# Patient Record
Sex: FEMALE | Race: ASIAN | NOT HISPANIC OR LATINO | URBAN - METROPOLITAN AREA
[De-identification: names, ages, dates, MRNs, and addresses within clinical notes are randomized per-mention and may not be internally consistent; named-entity substitution may affect disease eponyms.]

---

## 2019-11-30 ENCOUNTER — EMERGENCY (EMERGENCY)
Facility: HOSPITAL | Age: 34
LOS: 1 days | Discharge: ANOTHER HOSPITAL | End: 2019-11-30
Admitting: EMERGENCY MEDICINE
Payer: MEDICAID

## 2019-11-30 VITALS
TEMPERATURE: 98 F | RESPIRATION RATE: 18 BRPM | DIASTOLIC BLOOD PRESSURE: 65 MMHG | OXYGEN SATURATION: 100 % | HEART RATE: 80 BPM | SYSTOLIC BLOOD PRESSURE: 123 MMHG

## 2019-11-30 DIAGNOSIS — F33.2 MAJOR DEPRESSIVE DISORDER, RECURRENT SEVERE WITHOUT PSYCHOTIC FEATURES: ICD-10-CM

## 2019-11-30 LAB
ALBUMIN SERPL ELPH-MCNC: 4.2 G/DL — SIGNIFICANT CHANGE UP (ref 3.3–5)
ALP SERPL-CCNC: 67 U/L — SIGNIFICANT CHANGE UP (ref 40–120)
ALT FLD-CCNC: 20 U/L — SIGNIFICANT CHANGE UP (ref 4–33)
AMPHET UR-MCNC: NEGATIVE — SIGNIFICANT CHANGE UP
ANION GAP SERPL CALC-SCNC: 14 MMO/L — SIGNIFICANT CHANGE UP (ref 7–14)
APAP SERPL-MCNC: < 15 UG/ML — LOW (ref 15–25)
APPEARANCE UR: SIGNIFICANT CHANGE UP
AST SERPL-CCNC: 21 U/L — SIGNIFICANT CHANGE UP (ref 4–32)
BACTERIA # UR AUTO: HIGH
BARBITURATES UR SCN-MCNC: NEGATIVE — SIGNIFICANT CHANGE UP
BASOPHILS # BLD AUTO: 0.03 K/UL — SIGNIFICANT CHANGE UP (ref 0–0.2)
BASOPHILS NFR BLD AUTO: 0.6 % — SIGNIFICANT CHANGE UP (ref 0–2)
BENZODIAZ UR-MCNC: NEGATIVE — SIGNIFICANT CHANGE UP
BILIRUB SERPL-MCNC: < 0.2 MG/DL — LOW (ref 0.2–1.2)
BILIRUB UR-MCNC: NEGATIVE — SIGNIFICANT CHANGE UP
BLOOD UR QL VISUAL: SIGNIFICANT CHANGE UP
BUN SERPL-MCNC: 11 MG/DL — SIGNIFICANT CHANGE UP (ref 7–23)
CALCIUM SERPL-MCNC: 9.2 MG/DL — SIGNIFICANT CHANGE UP (ref 8.4–10.5)
CANNABINOIDS UR-MCNC: NEGATIVE — SIGNIFICANT CHANGE UP
CHLORIDE SERPL-SCNC: 102 MMOL/L — SIGNIFICANT CHANGE UP (ref 98–107)
CO2 SERPL-SCNC: 19 MMOL/L — LOW (ref 22–31)
COCAINE METAB.OTHER UR-MCNC: NEGATIVE — SIGNIFICANT CHANGE UP
COLOR SPEC: COLORLESS — SIGNIFICANT CHANGE UP
CREAT SERPL-MCNC: 0.64 MG/DL — SIGNIFICANT CHANGE UP (ref 0.5–1.3)
EOSINOPHIL # BLD AUTO: 0.1 K/UL — SIGNIFICANT CHANGE UP (ref 0–0.5)
EOSINOPHIL NFR BLD AUTO: 1.9 % — SIGNIFICANT CHANGE UP (ref 0–6)
ETHANOL BLD-MCNC: < 10 MG/DL — SIGNIFICANT CHANGE UP
GLUCOSE SERPL-MCNC: 88 MG/DL — SIGNIFICANT CHANGE UP (ref 70–99)
GLUCOSE UR-MCNC: NEGATIVE — SIGNIFICANT CHANGE UP
HCG SERPL-ACNC: < 5 MIU/ML — SIGNIFICANT CHANGE UP
HCT VFR BLD CALC: 37.6 % — SIGNIFICANT CHANGE UP (ref 34.5–45)
HGB BLD-MCNC: 12.2 G/DL — SIGNIFICANT CHANGE UP (ref 11.5–15.5)
HYALINE CASTS # UR AUTO: HIGH
IMM GRANULOCYTES NFR BLD AUTO: 0.2 % — SIGNIFICANT CHANGE UP (ref 0–1.5)
KETONES UR-MCNC: NEGATIVE — SIGNIFICANT CHANGE UP
LEUKOCYTE ESTERASE UR-ACNC: SIGNIFICANT CHANGE UP
LYMPHOCYTES # BLD AUTO: 1.77 K/UL — SIGNIFICANT CHANGE UP (ref 1–3.3)
LYMPHOCYTES # BLD AUTO: 32.8 % — SIGNIFICANT CHANGE UP (ref 13–44)
MCHC RBC-ENTMCNC: 27.7 PG — SIGNIFICANT CHANGE UP (ref 27–34)
MCHC RBC-ENTMCNC: 32.4 % — SIGNIFICANT CHANGE UP (ref 32–36)
MCV RBC AUTO: 85.3 FL — SIGNIFICANT CHANGE UP (ref 80–100)
METHADONE UR-MCNC: NEGATIVE — SIGNIFICANT CHANGE UP
MONOCYTES # BLD AUTO: 0.73 K/UL — SIGNIFICANT CHANGE UP (ref 0–0.9)
MONOCYTES NFR BLD AUTO: 13.5 % — SIGNIFICANT CHANGE UP (ref 2–14)
NEUTROPHILS # BLD AUTO: 2.75 K/UL — SIGNIFICANT CHANGE UP (ref 1.8–7.4)
NEUTROPHILS NFR BLD AUTO: 51 % — SIGNIFICANT CHANGE UP (ref 43–77)
NITRITE UR-MCNC: NEGATIVE — SIGNIFICANT CHANGE UP
NRBC # FLD: 0 K/UL — SIGNIFICANT CHANGE UP (ref 0–0)
OPIATES UR-MCNC: NEGATIVE — SIGNIFICANT CHANGE UP
OXYCODONE UR-MCNC: NEGATIVE — SIGNIFICANT CHANGE UP
PCP UR-MCNC: NEGATIVE — SIGNIFICANT CHANGE UP
PH UR: 6 — SIGNIFICANT CHANGE UP (ref 5–8)
PLATELET # BLD AUTO: 196 K/UL — SIGNIFICANT CHANGE UP (ref 150–400)
PMV BLD: 10.8 FL — SIGNIFICANT CHANGE UP (ref 7–13)
POTASSIUM SERPL-MCNC: 3.7 MMOL/L — SIGNIFICANT CHANGE UP (ref 3.5–5.3)
POTASSIUM SERPL-SCNC: 3.7 MMOL/L — SIGNIFICANT CHANGE UP (ref 3.5–5.3)
PROT SERPL-MCNC: 7.5 G/DL — SIGNIFICANT CHANGE UP (ref 6–8.3)
PROT UR-MCNC: 10 — SIGNIFICANT CHANGE UP
RBC # BLD: 4.41 M/UL — SIGNIFICANT CHANGE UP (ref 3.8–5.2)
RBC # FLD: 14.5 % — SIGNIFICANT CHANGE UP (ref 10.3–14.5)
RBC CASTS # UR COMP ASSIST: SIGNIFICANT CHANGE UP (ref 0–?)
SALICYLATES SERPL-MCNC: < 5 MG/DL — LOW (ref 15–30)
SODIUM SERPL-SCNC: 135 MMOL/L — SIGNIFICANT CHANGE UP (ref 135–145)
SP GR SPEC: 1.01 — SIGNIFICANT CHANGE UP (ref 1–1.04)
SQUAMOUS # UR AUTO: SIGNIFICANT CHANGE UP
UROBILINOGEN FLD QL: NORMAL — SIGNIFICANT CHANGE UP
WBC # BLD: 5.39 K/UL — SIGNIFICANT CHANGE UP (ref 3.8–10.5)
WBC # FLD AUTO: 5.39 K/UL — SIGNIFICANT CHANGE UP (ref 3.8–10.5)
WBC UR QL: >50 — HIGH (ref 0–?)

## 2019-11-30 PROCEDURE — 99285 EMERGENCY DEPT VISIT HI MDM: CPT | Mod: 25

## 2019-11-30 PROCEDURE — 99285 EMERGENCY DEPT VISIT HI MDM: CPT

## 2019-11-30 PROCEDURE — 93010 ELECTROCARDIOGRAM REPORT: CPT

## 2019-11-30 RX ORDER — SERTRALINE 25 MG/1
50 TABLET, FILM COATED ORAL DAILY
Refills: 0 | Status: DISCONTINUED | OUTPATIENT
Start: 2019-11-30 | End: 2019-11-30

## 2019-11-30 RX ORDER — OLANZAPINE 15 MG/1
5 TABLET, FILM COATED ORAL ONCE
Refills: 0 | Status: COMPLETED | OUTPATIENT
Start: 2019-11-30 | End: 2019-11-30

## 2019-11-30 RX ORDER — TETANUS TOXOID, REDUCED DIPHTHERIA TOXOID AND ACELLULAR PERTUSSIS VACCINE, ADSORBED 5; 2.5; 8; 8; 2.5 [IU]/.5ML; [IU]/.5ML; UG/.5ML; UG/.5ML; UG/.5ML
0.5 SUSPENSION INTRAMUSCULAR ONCE
Refills: 0 | Status: COMPLETED | OUTPATIENT
Start: 2019-11-30 | End: 2019-11-30

## 2019-11-30 RX ORDER — BACITRACIN ZINC 500 UNIT/G
1 OINTMENT IN PACKET (EA) TOPICAL ONCE
Refills: 0 | Status: COMPLETED | OUTPATIENT
Start: 2019-11-30 | End: 2019-11-30

## 2019-11-30 RX ADMIN — OLANZAPINE 5 MILLIGRAM(S): 15 TABLET, FILM COATED ORAL at 21:21

## 2019-11-30 RX ADMIN — Medication 1 APPLICATION(S): at 21:21

## 2019-11-30 RX ADMIN — TETANUS TOXOID, REDUCED DIPHTHERIA TOXOID AND ACELLULAR PERTUSSIS VACCINE, ADSORBED 0.5 MILLILITER(S): 5; 2.5; 8; 8; 2.5 SUSPENSION INTRAMUSCULAR at 21:21

## 2019-11-30 NOTE — ED PROVIDER NOTE - PROGRESS NOTE DETAILS
Raffaele KANG: Pt signed out to me.  She has been evaluated by psychiatry and is pending admission.  Labs reviewed, UA pos but pt is without urinary sxs.  Ucx sent, will not treat as pt is asymptomatic.  Pt currently sleeping comfortably, no issues.  Pending psych admission.

## 2019-11-30 NOTE — ED ADULT TRIAGE NOTE - CHIEF COMPLAINT QUOTE
pt. brought from primary care doctor's office, reported suicidal thoughts, arrives w/ superficial lacerations to the L wrist/arm. Denies homicidal ideations, hallucinations. Pt. dx w/ depression, states she took 2 Zoloft pills instead of 1.

## 2019-11-30 NOTE — ED BEHAVIORAL HEALTH NOTE - BEHAVIORAL HEALTH NOTE
SW obtained contact information from pt re: collateral contacts.  Pt provided the names of two friends:  Baljitsoraida Rene [phone] and La Mayfield, [phone].  SW contacted Sabrinala, however no answer-message was left requesting a return phone call.  SW contacted Pan who reports that pt has spoken about feeling hopeless and depressed.  She states that pt has not shared too much information with her-only that she was at the doctors office earlier today.  She states that pt has not expressed any suicidal thoughts or ideations to her in their conversations.  She reports that pt lives alone.

## 2019-11-30 NOTE — ED BEHAVIORAL HEALTH ASSESSMENT NOTE - SUICIDE PROTECTIVE FACTORS
Responsibility to family and others/Identifies reasons for living/Has future plans/Cultural, spiritual and/or moral attitudes against suicide/Confucianist beliefs

## 2019-11-30 NOTE — ED BEHAVIORAL HEALTH ASSESSMENT NOTE - DESCRIPTION
has 2 children,  from family, lives alone, came [place] [facility] [place], [place] in [place] for [facility] but came back in 2015;  from  in 2017; is not a practicing [employer], attended 12th grade migraine, back pains Since her  ED arrival, the Pt has been cooperative.  There has been no agitation/aggressive behavior.  No verbalization of active HI.   Continues to endorse SI but no intent or plans at this time.  There are no signs/symptoms of acute yessica or florid psychosis.  The Pt is not experiencing any AH/VH.  Pt is not showing any signs/symptoms of intoxication or withdrawal.  She has not tested limits.. Has maintained appropriate boundaries. Pt has been easily redirected.      Vital Signs Last 24 Hrs  T(C): 36.7 (30 Nov 2019 18:43), Max: 36.7 (30 Nov 2019 18:43)  T(F): 98 (30 Nov 2019 18:43), Max: 98 (30 Nov 2019 18:43)  HR: 80 (30 Nov 2019 18:43) (80 - 80)  BP: 123/65 (30 Nov 2019 18:43) (123/65 - 123/65)  BP(mean): --  RR: 18 (30 Nov 2019 18:43) (18 - 18)  SpO2: 100% (30 Nov 2019 18:43) (100% - 100%)

## 2019-11-30 NOTE — ED BEHAVIORAL HEALTH ASSESSMENT NOTE - OTHER
JEIMY HUGO STOP reference #: [id number]: NO CONTROLLED SUBSTANCE PRESCRIBED marital conflict see HPI for details

## 2019-11-30 NOTE — ED BEHAVIORAL HEALTH ASSESSMENT NOTE - SUICIDE RISK FACTORS
Access to lethal methods (pills, firearm, etc.: Ask specifically about presence or absence of a firearm in the home or ease of accessing/Insomnia/Recent onset of current/past psychiatric diagnosis/Anhedonia/Hopelessness or despair/Mood Disorder current/past/Impulsivity/Current mood episode/Agitation/Severe Anxiety/Panic/Unable to engage in safety planning

## 2019-11-30 NOTE — ED ADULT NURSE NOTE - OBJECTIVE STATEMENT
34 year old female presents to the ER from MD's office for depression, pt expressed suicidal thoughts in the office and arrived here via EMS with superficial lacerations noted to left forearm 34 year old female presents to the ER from MD's office for depression, pt expressed suicidal thoughts in the office and arrived here via EMS with superficial lacerations noted to left forearm pt states she cut herself yesterday and wanted to kill herself

## 2019-11-30 NOTE — ED BEHAVIORAL HEALTH ASSESSMENT NOTE - AXIS IV
Economic problems/Other psychosocial and environmental problems/Problem related to social environment/Problems with primary support
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
## 2019-11-30 NOTE — ED BEHAVIORAL HEALTH ASSESSMENT NOTE - RISK ASSESSMENT
RISK ASSESSMENT:   Modifiable risk factors: depression, anxiety and delusions  ]Unmodifiable risk factors: Pt is doing poorly, hx of OD on pills, had SIB, limited social support, legal issues, financial constraints   Protective factors: domiciled, close relationship and therapeutic relationship with children, no access to guns, not manic, no substance abuse hx, no fam hx of SA, Faith, working     At this time given all risks taken into consideration, the Pt is at chronically elevated risk of harming self and would not be deemed dischargeable back to the community.  That increased risk can be mitigated by a psychiatric admission with supervision, continuing psych meds with titration towards efficacious dosing range High Acute Suicide Risk

## 2019-11-30 NOTE — ED PROVIDER NOTE - CLINICAL SUMMARY MEDICAL DECISION MAKING FREE TEXT BOX
35 y/o F hx MDD   Labs, Urine Tox/UA, EKG. Multiple superficial  cuts  to  left forearm. No evidence of cellulitis. No indication for suturing. Tetanus updated.  Bacitracin to area      Medical evaluation performed. There is no clinical evidence of intoxication or any acute medical problem requiring immediate intervention. Patient is awaiting psychiatric consultation. Final disposition will be determined by psychiatrist.

## 2019-11-30 NOTE — ED BEHAVIORAL HEALTH ASSESSMENT NOTE - ACTIVATING EVENTS/STRESSORS
Non-compliant or not receiving treatment/Triggering events leading to humiliation, shame, and/or despair (e.g. Loss of relationship, financial or health status) (real or anticipated)/Legal problems/Inadequate social supports/Hopeless about or dissatisfied with provider or treatment/Current or pending social isolation

## 2019-11-30 NOTE — ED PROVIDER NOTE - OBJECTIVE STATEMENT
33 y/o F hx MDD BIBA referred by PCP w c/o  worsening depression and suicidal behaviour. Admit to self inflicting multiple superficial  cuts  with a razor to her left forearm.  Admits to multiple social stressors.  Denies HI/VH/AH. Denies pain, SOB, fever, chills chest/ abdominal  discomfort. Denies falling,  punching or kicking any objects. Denies recent use of alcohol or illicit drugs. Admits to medication compliance. 33 y/o F hx MDD BIBA referred by PCP w c/o  worsening depression and suicidal behaviour. Admit to self inflicting multiple superficial  cuts  with a knife to her left forearm.  Admits to multiple social stressors.  Denies HI/VH/AH. Denies pain, SOB, fever, chills chest/ abdominal  discomfort. Denies falling,  punching or kicking any objects. Denies recent use of alcohol or illicit drugs. Admits to medication compliance.

## 2019-11-30 NOTE — ED BEHAVIORAL HEALTH ASSESSMENT NOTE - SUMMARY
34/F with no established past psych hx, no prior psych hosps, reported prior hx of OD on advil in 2009 but did not seek any psych help nor was she admitted due to the OD; has no substance abuse hx.  Today, BIB EMS as a referral from his PCP's office where she initially reported having suicidal thoughts.  Whilst at the triage, she was noted to have superficial cuts to the left forearm/wrist area.  At this time, she presents with symptoms suggestive of severe MDD; there is psychotic component (referential thoughts/persecutory delusions - "nobody likes me").  Said symptoms precipitated and perpetuated by ongoing marital conflict particularly re: custody over her children and children support; other psychosocial stressors involved are: limited social support, financial constraints, ongoing legal issues.  Said symptoms have significantly caused functional impairment on the Pt.  Pt has no symptoms suggestive of yessica.  She is not delirious.  Not intoxicated or in withdrawal.  At this time, Pt cannot be safely discharged back to the community as she remains a threat to self.  She will benefit from in-Pt psych admission for stabilization and ensuring safety    RECOMMENDATIONS:   1. CONTINUE WITH ZOLOFT 50mg daily. titrate as necessary  2. ZYPREXA 5mg HS for control of psychosis, sleep disturbance  3. PRNs: ZYPREXA 5mg PO/IM Q6Hrs for agitation/SEVERE AGITATION   4. ADMIT 9.13 once bed available and Pt is medically cleared

## 2019-12-01 ENCOUNTER — OUTPATIENT (OUTPATIENT)
Dept: OUTPATIENT SERVICES | Facility: HOSPITAL | Age: 34
LOS: 1 days | End: 2019-12-01
Payer: MEDICAID

## 2019-12-01 ENCOUNTER — INPATIENT (INPATIENT)
Facility: HOSPITAL | Age: 34
LOS: 3 days | Discharge: HOME | DRG: 885 | End: 2019-12-05
Attending: PSYCHIATRY & NEUROLOGY | Admitting: PSYCHIATRY & NEUROLOGY
Payer: COMMERCIAL

## 2019-12-01 VITALS
RESPIRATION RATE: 18 BRPM | DIASTOLIC BLOOD PRESSURE: 66 MMHG | TEMPERATURE: 98 F | HEART RATE: 93 BPM | OXYGEN SATURATION: 100 % | SYSTOLIC BLOOD PRESSURE: 106 MMHG

## 2019-12-01 DIAGNOSIS — F33.2 MAJOR DEPRESSIVE DISORDER, RECURRENT SEVERE WITHOUT PSYCHOTIC FEATURES: ICD-10-CM

## 2019-12-01 DIAGNOSIS — F32.0 MAJOR DEPRESSIVE DISORDER, SINGLE EPISODE, MILD: ICD-10-CM

## 2019-12-01 PROCEDURE — G0008: CPT

## 2019-12-01 PROCEDURE — 87086 URINE CULTURE/COLONY COUNT: CPT

## 2019-12-01 PROCEDURE — 90686 IIV4 VACC NO PRSV 0.5 ML IM: CPT

## 2019-12-01 PROCEDURE — 84443 ASSAY THYROID STIM HORMONE: CPT

## 2019-12-01 PROCEDURE — 36415 COLL VENOUS BLD VENIPUNCTURE: CPT

## 2019-12-01 PROCEDURE — 80061 LIPID PANEL: CPT

## 2019-12-01 PROCEDURE — 90791 PSYCH DIAGNOSTIC EVALUATION: CPT

## 2019-12-01 PROCEDURE — 83036 HEMOGLOBIN GLYCOSYLATED A1C: CPT

## 2019-12-01 PROCEDURE — 93005 ELECTROCARDIOGRAM TRACING: CPT

## 2019-12-01 PROCEDURE — 84702 CHORIONIC GONADOTROPIN TEST: CPT

## 2019-12-01 RX ORDER — DIPHENHYDRAMINE HCL 50 MG
50 CAPSULE ORAL EVERY 6 HOURS
Refills: 0 | Status: DISCONTINUED | OUTPATIENT
Start: 2019-12-01 | End: 2019-12-05

## 2019-12-01 RX ORDER — CEPHALEXIN 500 MG
500 CAPSULE ORAL EVERY 12 HOURS
Refills: 0 | Status: DISCONTINUED | OUTPATIENT
Start: 2019-12-01 | End: 2019-12-01

## 2019-12-01 RX ORDER — HALOPERIDOL DECANOATE 100 MG/ML
5 INJECTION INTRAMUSCULAR EVERY 6 HOURS
Refills: 0 | Status: DISCONTINUED | OUTPATIENT
Start: 2019-12-01 | End: 2019-12-05

## 2019-12-01 RX ORDER — LACTOBACILLUS ACIDOPHILUS 100MM CELL
1 CAPSULE ORAL DAILY
Refills: 0 | Status: DISCONTINUED | OUTPATIENT
Start: 2019-12-01 | End: 2019-12-05

## 2019-12-01 RX ORDER — INFLUENZA VIRUS VACCINE 15; 15; 15; 15 UG/.5ML; UG/.5ML; UG/.5ML; UG/.5ML
0.5 SUSPENSION INTRAMUSCULAR ONCE
Refills: 0 | Status: COMPLETED | OUTPATIENT
Start: 2019-12-01 | End: 2019-12-03

## 2019-12-01 RX ORDER — CEPHALEXIN 500 MG
500 CAPSULE ORAL EVERY 12 HOURS
Refills: 0 | Status: DISCONTINUED | OUTPATIENT
Start: 2019-12-01 | End: 2019-12-05

## 2019-12-01 RX ORDER — SERTRALINE 25 MG/1
50 TABLET, FILM COATED ORAL DAILY
Refills: 0 | Status: DISCONTINUED | OUTPATIENT
Start: 2019-12-02 | End: 2019-12-02

## 2019-12-01 RX ADMIN — Medication 500 MILLIGRAM(S): at 21:30

## 2019-12-01 NOTE — ED ADULT NURSE REASSESSMENT NOTE - NS ED NURSE REASSESS COMMENT FT1
0830 Received report from night RN pt lying on stretcher in nad eyes close breathing even & unlabored safety & comfort measures maintained eval on going.
1330 Pt calm & cooperative c/o depression pt made aware of admission to [facility] transported by EMS.
Pt sleeping, respirations even and unlabored, NAD noted at this time. will continue to monitor
Pt sleeping, respirations even and non labored. Pt awaiting inpatient psychiatric admission bed.

## 2019-12-01 NOTE — H&P ADULT - ASSESSMENT
33 yo F with PMH significant for Migraine. Depression admitted to 5N for eval. of worsening of depression and passive suicidal ideation.    # Depression/Passive Suicidal ideation/ Psych problems  - Manage as per primary psych team     # Suprapubic pain  # UA contaminated but WBC > 50 and Pt is c/o subjective fever and suprapubic pain  - Started on Keflex by ED MD, c/w Abx  - UC    # Hx of Migraine  - Pt reported that she takes meds daily  for migraine but don't remember name  - C/w Home meds, pt reported that friend will bring medicine tomorrow or Pt will call to obtain info about meds name and dose     # DVT Ppx  - Encourage Ambulation    IMPROVE VTE Individual Risk Assessment    RISK                                                                Points    [  ] Previous VTE                                                  3    [  ] Thrombophilia                                               2    [  ] Lower limb paralysis                                      2        (unable to hold up >15 seconds)      [  ] Current Cancer                                              2         (within 6 months)    [  ] Immobilization > 24 hrs                                1    [  ] ICU/CCU stay > 24 hours                              1    [  ] Age > 60                                                      1    IMPROVE VTE Score _0________    IMPROVE Score 0-1: Low Risk, No VTE prophylaxis required for most patients, encourage ambulation.   IMPROVE Score 2-3: At risk, pharmacologic VTE prophylaxis is indicated for most patients (in the absence of a contraindication)  IMPROVE Score > or = 4: High Risk, pharmacologic VTE prophylaxis is indicated for most patients (in the absence of a contraindication)    Case d/w        -

## 2019-12-01 NOTE — ED BEHAVIORAL HEALTH NOTE - BEHAVIORAL HEALTH NOTE
Worker called [facility] and spoke to [facility] who states there are no adult beds. Worker then called [facility] and left a  for call back. Patient has straight Janie and cannot go to [facility]. Worker then called [facility] and spoke to Dr. Sanders who states to fax the packet for review. Worker called [facility] and spoke with Dr. Gray who states there is no beds.
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
## 2019-12-01 NOTE — ED BEHAVIORAL HEALTH NOTE - BEHAVIORAL HEALTH NOTE
Worker spoke with VIDYA Schafer at [facility] and worker explained that EMS presented at this time to the emergency room. He states patient can still be transported for acceptance.

## 2019-12-01 NOTE — H&P ADULT - HISTORY OF PRESENT ILLNESS
35 yo F with PMH significant for Migraine. Depression admitted to N for eval. of worsening of depression and passive suicidal ideation. Pt is resting in bed, calm. Pt is c/o worsening of depression 2/2 to stress as she is going through divorce process. c/o helplessness, hopelessness, feeling down. multiple  superficial cuts on left forearm. Pt is also c/o suprapubic pain and pressure sometimes. c/o subjective fever. Denies chills, N/V/D. Denies headache, dizziness, chest pain, palpitation or SOB. Medicine consult is called for medical Mx. Pt was seen and examined in presence of Night Nursing Assistant.    PMH: As above  PSH: Pt denies any surgery in the past  Social Hx: Pt denies smoking, drinking alcohol or using any recreational drugs.  Utox: Negative.   Family Hx: Pt is tearful on asking question about family Hx, couldn't obtain any info.

## 2019-12-01 NOTE — PATIENT PROFILE BEHAVIORAL HEALTH - NS TRANSFER PATIENT BELONGINGS
Other belongings/ear phones;$3.04; 4 credit cards/Cell Phone/PDA (specify)/Clothing/Jewelry/Money (specify)
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
## 2019-12-01 NOTE — ED BEHAVIORAL HEALTH NOTE - BEHAVIORAL HEALTH NOTE
Worker called [facility] and spoke with NP Hanh who states that patient is accepted to [facility] and nurse to nurse should be provided at [phone]. Accepting Dr. Reyes. Worker informed RN to set up transportation. Worker also faxed legal paper work to [facility].

## 2019-12-01 NOTE — PATIENT PROFILE BEHAVIORAL HEALTH - COPING STRESSORS, PROFILE
employment concerns/mental health condition/relationship concerns/limited support system/financial/change in residence

## 2019-12-01 NOTE — H&P ADULT - SKIN COMMENTS
multiple superficial cuts on left forearm, no active bleeding or discharge , no erythema, healing well

## 2019-12-01 NOTE — PROGRESS NOTE BEHAVIORAL HEALTH - NSBHADDHXPSYSOCFT_PSY_A_CORE
As per chart review: has 2 children,  from family, lives alone, came [place] from [facility] in [place]2005, left [place] in [place] for [place] but came back in 2015;  from  in 2017; is not a practicing [employer]az[employer]ism, attended 12th grade
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
## 2019-12-01 NOTE — ED BEHAVIORAL HEALTH NOTE - BEHAVIORAL HEALTH NOTE
Worker called [facility] and spoke with VIDYA Schafer. Worker explained that ems was called by RN and that they will arrive before 4pm. He states that patient is still accepted as of now.

## 2019-12-01 NOTE — H&P ADULT - NSHPPHYSICALEXAM_GEN_ALL_CORE
Vital Signs Last 24 Hrs  T(C): 36.8 (01 Dec 2019 09:40), Max: 36.8 (01 Dec 2019 09:40)  T(F): 98.2 (01 Dec 2019 09:40), Max: 98.2 (01 Dec 2019 09:40)  HR: 93 (01 Dec 2019 09:40) (93 - 93)  BP: 106/66 (01 Dec 2019 09:40) (106/66 - 106/66)  RR: 18 (01 Dec 2019 09:40) (18 - 18)  SpO2: 100% (01 Dec 2019 09:40) (100% - 100%)

## 2019-12-01 NOTE — PROGRESS NOTE BEHAVIORAL HEALTH - NSBHFUPSTRENGTHS_PSY_A_CORE
In good physical health/Motivated and ready for change/Attempting to realize his/her potential/Intelligent

## 2019-12-02 VITALS — RESPIRATION RATE: 12 BRPM | TEMPERATURE: 98 F | OXYGEN SATURATION: 100 %

## 2019-12-02 DIAGNOSIS — F34.1 DYSTHYMIC DISORDER: ICD-10-CM

## 2019-12-02 DIAGNOSIS — Z63.0 PROBLEMS IN RELATIONSHIP WITH SPOUSE OR PARTNER: ICD-10-CM

## 2019-12-02 LAB
BACTERIA UR CULT: SIGNIFICANT CHANGE UP
CHOLEST SERPL-MCNC: 152 MG/DL — SIGNIFICANT CHANGE UP (ref 10–199)
HDLC SERPL-MCNC: 38 MG/DL — LOW
LIPID PNL WITH DIRECT LDL SERPL: 100 MG/DL — SIGNIFICANT CHANGE UP
SPECIMEN SOURCE: SIGNIFICANT CHANGE UP
TOTAL CHOLESTEROL/HDL RATIO MEASUREMENT: 4 RATIO — SIGNIFICANT CHANGE UP (ref 3.3–7.1)
TRIGL SERPL-MCNC: 69 MG/DL — SIGNIFICANT CHANGE UP (ref 10–149)

## 2019-12-02 PROCEDURE — 93010 ELECTROCARDIOGRAM REPORT: CPT

## 2019-12-02 PROCEDURE — 99232 SBSQ HOSP IP/OBS MODERATE 35: CPT

## 2019-12-02 RX ORDER — BACITRACIN ZINC 500 UNIT/G
1 OINTMENT IN PACKET (EA) TOPICAL
Refills: 0 | Status: COMPLETED | OUTPATIENT
Start: 2019-12-02 | End: 2019-12-04

## 2019-12-02 RX ORDER — SERTRALINE 25 MG/1
75 TABLET, FILM COATED ORAL DAILY
Refills: 0 | Status: DISCONTINUED | OUTPATIENT
Start: 2019-12-03 | End: 2019-12-05

## 2019-12-02 RX ORDER — ALBUTEROL 90 UG/1
2 AEROSOL, METERED ORAL EVERY 6 HOURS
Refills: 0 | Status: DISCONTINUED | OUTPATIENT
Start: 2019-12-02 | End: 2019-12-05

## 2019-12-02 RX ORDER — ACETAMINOPHEN 500 MG
650 TABLET ORAL EVERY 6 HOURS
Refills: 0 | Status: DISCONTINUED | OUTPATIENT
Start: 2019-12-02 | End: 2019-12-05

## 2019-12-02 RX ORDER — LANOLIN ALCOHOL/MO/W.PET/CERES
3 CREAM (GRAM) TOPICAL AT BEDTIME
Refills: 0 | Status: DISCONTINUED | OUTPATIENT
Start: 2019-12-02 | End: 2019-12-05

## 2019-12-02 RX ADMIN — Medication 500 MILLIGRAM(S): at 21:09

## 2019-12-02 RX ADMIN — Medication 650 MILLIGRAM(S): at 09:05

## 2019-12-02 RX ADMIN — Medication 3 MILLIGRAM(S): at 21:15

## 2019-12-02 RX ADMIN — SERTRALINE 50 MILLIGRAM(S): 25 TABLET, FILM COATED ORAL at 09:04

## 2019-12-02 RX ADMIN — Medication 500 MILLIGRAM(S): at 09:04

## 2019-12-02 RX ADMIN — Medication 650 MILLIGRAM(S): at 08:21

## 2019-12-02 RX ADMIN — Medication 1 TABLET(S): at 09:04

## 2019-12-02 RX ADMIN — Medication 1 APPLICATION(S): at 21:09

## 2019-12-02 SDOH — SOCIAL STABILITY - SOCIAL INSECURITY: PROBLEMS IN RELATIONSHIP WITH SPOUSE OR PARTNER: Z63.0

## 2019-12-02 NOTE — PROGRESS NOTE ADULT - SUBJECTIVE AND OBJECTIVE BOX
HOSPITALIST PROGRESS NOTE:  SUBJECTIVE:  PCP:  Chief Complaint: Patient is a 34y old  Female who presents with a chief complaint of c/o worsening of depression (01 Dec 2019 19:47)      HPI:  35 yo F with PMH significant for Migraine. Depression admitted to  for eval. of worsening of depression and passive suicidal ideation. Pt is resting in bed, calm. Pt is c/o worsening of depression 2/2 to stress as she is going through divorce process. c/o helplessness, hopelessness, feeling down. multiple  superficial cuts on left forearm. Pt is also c/o suprapubic pain and pressure sometimes. c/o subjective fever. Denies chills, N/V/D. Denies headache, dizziness, chest pain, palpitation or SOB. Medicine consult is called for medical Mx. Pt was seen and examined in presence of Night Nursing Assistant.    : ABove reviewed; Patient has some pelvic pain; No urinary symptoms; Urine cx never sent prior to starting ABX      Allergies:  No Known Allergies    REVIEW OF SYSTEMS:  See HPI. All other review of systems is negative unless indicated above.     OBJECTIVE  Physical Exam:  Vital Signs:    Vital Signs Last 24 Hrs  T(C): 36.7 (02 Dec 2019 11:42), Max: 36.7 (02 Dec 2019 11:42)  T(F): 98.1 (02 Dec 2019 11:42), Max: 98.1 (02 Dec 2019 11:42)  HR: --  BP: --  BP(mean): --  RR: 12 (02 Dec 2019 11:42) (12 - 12)  SpO2: 100% (02 Dec 2019 11:42) (100% - 100%)  I&O's Summary      Constitutional: NAD, awake and alert, well-developed  Neurological: AAO x 3, no focal deficits  HEENT: PERRLA, EOMI, MMM  Neck: Soft and supple, No LAD, No JVD  Respiratory: Breath sounds are clear bilaterally, No wheezing, rales or rhonchi  Cardiovascular: S1 and S2, regular rate and rhythm; no Murmurs, gallops or rubs  Gastrointestinal: Bowel Sounds present, soft, nontender, nondistended, no guarding, no rebound tenderness  Back: No CVA tenderness   Extremities: No peripheral edema  Vascular: 2+ peripheral pulses  Musculoskeletal: 5/5 strength b/l upper and lower extremities  Skin: No rashes  Breast: Deferred  Rectal: Deferred    MEDICATIONS  (STANDING):  BACItracin   Ointment 1 Application(s) Topical two times a day  cephalexin 500 milliGRAM(s) Oral every 12 hours  influenza   Vaccine 0.5 milliLiter(s) IntraMuscular once  lactobacillus acidophilus 1 Tablet(s) Oral daily      LABS: All Labs Reviewed:                        12.2   5.39  )-----------( 196      ( 2019 20:00 )             37.6         135  |  102  |  11  ----------------------------<  88  3.7   |  19<L>  |  0.64    Ca    9.2      2019 20:00    TPro  7.5  /  Alb  4.2  /  TBili  < 0.2<L>  /  DBili  x   /  AST  21  /  ALT  20  /  AlkPhos  67              Urinalysis Basic - ( 2019 20:00 )    Color: COLORLESS / Appearance: Lt TURBID / S.008 / pH: 6.0  Gluc: NEGATIVE / Ketone: NEGATIVE  / Bili: NEGATIVE / Urobili: NORMAL   Blood: TRACE / Protein: 10 / Nitrite: NEGATIVE   Leuk Esterase: LARGE / RBC: 3-5 / WBC >50   Sq Epi: MODERATE / Non Sq Epi: x / Bacteria: MANY        Blood Culture:    @ 22:20  Organism --  Gram Stain Blood -- Gram Stain --  Specimen Source URINE MIDSTREAM  Culture-Blood --        RADIOLOGY/EKG:

## 2019-12-02 NOTE — PROGRESS NOTE BEHAVIORAL HEALTH - NSBHCHARTREVIEWLAB_PSY_A_CORE FT
CBC Full  -  ( 2019 20:00 )  WBC Count : 5.39 K/uL  RBC Count : 4.41 M/uL  Hemoglobin : 12.2 g/dL  Hematocrit : 37.6 %  Platelet Count - Automated : 196 K/uL  Mean Cell Volume : 85.3 fL  Mean Cell Hemoglobin : 27.7 pg  Mean Cell Hemoglobin Concentration : 32.4 %  Auto Neutrophil # : 2.75 K/uL  Auto Lymphocyte # : 1.77 K/uL  Auto Monocyte # : 0.73 K/uL  Auto Eosinophil # : 0.10 K/uL  Auto Basophil # : 0.03 K/uL  Auto Neutrophil % : 51.0 %  Auto Lymphocyte % : 32.8 %  Auto Monocyte % : 13.5 %  Auto Eosinophil % : 1.9 %  Auto Basophil % : 0.6 %        135  |  102  |  11  ----------------------------<  88  3.7   |  19<L>  |  0.64    Ca    9.2      2019 20:00    TPro  7.5  /  Alb  4.2  /  TBili  < 0.2<L>  /  DBili  x   /  AST  21  /  ALT  20  /  AlkPhos  67  11-30      Urinalysis Basic - ( 2019 20:00 )    Color: COLORLESS / Appearance: Lt TURBID / S.008 / pH: 6.0  Gluc: NEGATIVE / Ketone: NEGATIVE  / Bili: NEGATIVE / Urobili: NORMAL   Blood: TRACE / Protein: 10 / Nitrite: NEGATIVE   Leuk Esterase: LARGE / RBC: 3-5 / WBC >50   Sq Epi: MODERATE / Non Sq Epi: x / Bacteria: MANY

## 2019-12-03 LAB
CULTURE RESULTS: SIGNIFICANT CHANGE UP
SPECIMEN SOURCE: SIGNIFICANT CHANGE UP

## 2019-12-03 PROCEDURE — 93010 ELECTROCARDIOGRAM REPORT: CPT

## 2019-12-03 PROCEDURE — 99232 SBSQ HOSP IP/OBS MODERATE 35: CPT

## 2019-12-03 RX ADMIN — Medication 500 MILLIGRAM(S): at 09:06

## 2019-12-03 RX ADMIN — Medication 500 MILLIGRAM(S): at 21:22

## 2019-12-03 RX ADMIN — Medication 1 TABLET(S): at 09:06

## 2019-12-03 RX ADMIN — SERTRALINE 75 MILLIGRAM(S): 25 TABLET, FILM COATED ORAL at 09:06

## 2019-12-03 RX ADMIN — INFLUENZA VIRUS VACCINE 0.5 MILLILITER(S): 15; 15; 15; 15 SUSPENSION INTRAMUSCULAR at 09:33

## 2019-12-03 RX ADMIN — Medication 1 APPLICATION(S): at 21:23

## 2019-12-03 NOTE — ED BEHAVIORAL HEALTH NOTE - BEHAVIORAL HEALTH NOTE
SARAH spoke with Dana RAMIREZ from [facility] and obtained authorization for 3 days, 12/1/19 – 12/3/19, review date 12/3/19 with reviewer Alma Unger [phone]. Auth #[id number].

## 2019-12-03 NOTE — PROGRESS NOTE ADULT - SUBJECTIVE AND OBJECTIVE BOX
HOSPITALIST PROGRESS NOTE:  SUBJECTIVE:  PCP:  Chief Complaint: Patient is a 34y old  Female who presents with a chief complaint of c/o worsening of depression (01 Dec 2019 19:47)      HPI:  33 yo F with PMH significant for Migraine. Depression admitted to  for eval. of worsening of depression and passive suicidal ideation. Pt is resting in bed, calm. Pt is c/o worsening of depression 2/2 to stress as she is going through divorce process. c/o helplessness, hopelessness, feeling down. multiple  superficial cuts on left forearm. Pt is also c/o suprapubic pain and pressure sometimes. c/o subjective fever. Denies chills, N/V/D. Denies headache, dizziness, chest pain, palpitation or SOB. Medicine consult is called for medical Mx. Pt was seen and examined in presence of Night Nursing Assistant.    : ABove reviewed; Patient has some pelvic pain; No urinary symptoms; Urine cx never sent prior to starting ABX  12/3:  No overnight events; Urine Cx from  and  was normal       Allergies:  No Known Allergies    REVIEW OF SYSTEMS:  See HPI. All other review of systems is negative unless indicated above.     OBJECTIVE  Physical Exam:  Vital Signs Last 24 Hrs  T(C): 36.9 (03 Dec 2019 09:10), Max: 36.9 (03 Dec 2019 09:10)  T(F): 98.4 (03 Dec 2019 09:10), Max: 98.4 (03 Dec 2019 09:10)  HR: --  BP: --  BP(mean): --  RR: 14 (03 Dec 2019 09:10) (14 - 14)  SpO2: 100% (03 Dec 2019 09:10) (100% - 100%)      Constitutional: NAD, awake and alert, well-developed  Neurological: AAO x 3, no focal deficits  HEENT: PERRLA, EOMI, MMM  Neck: Soft and supple, No LAD, No JVD  Respiratory: Breath sounds are clear bilaterally, No wheezing, rales or rhonchi  Cardiovascular: S1 and S2, regular rate and rhythm; no Murmurs, gallops or rubs  Gastrointestinal: Bowel Sounds present, soft, nontender, nondistended, no guarding, no rebound tenderness  Back: No CVA tenderness   Extremities: No peripheral edema  Vascular: 2+ peripheral pulses  Musculoskeletal: 5/5 strength b/l upper and lower extremities  Skin: No rashes  Breast: Deferred  Rectal: Deferred    MEDICATIONS  (STANDING):  BACItracin   Ointment 1 Application(s) Topical two times a day  cephalexin 500 milliGRAM(s) Oral every 12 hours  influenza   Vaccine 0.5 milliLiter(s) IntraMuscular once  lactobacillus acidophilus 1 Tablet(s) Oral daily      LABS: All Labs Reviewed:                        12.2   5.39  )-----------( 196      ( 2019 20:00 )             37.6         135  |  102  |  11  ----------------------------<  88  3.7   |  19<L>  |  0.64    Ca    9.2      2019 20:00    TPro  7.5  /  Alb  4.2  /  TBili  < 0.2<L>  /  DBili  x   /  AST  21  /  ALT  20  /  AlkPhos  67              Urinalysis Basic - ( 2019 20:00 )    Color: COLORLESS / Appearance: Lt TURBID / S.008 / pH: 6.0  Gluc: NEGATIVE / Ketone: NEGATIVE  / Bili: NEGATIVE / Urobili: NORMAL   Blood: TRACE / Protein: 10 / Nitrite: NEGATIVE   Leuk Esterase: LARGE / RBC: 3-5 / WBC >50   Sq Epi: MODERATE / Non Sq Epi: x / Bacteria: MANY        Blood Culture:    @ 22:20  Organism --  Gram Stain Blood -- Gram Stain --  Specimen Source URINE MIDSTREAM  Culture-Blood --        RADIOLOGY/EKG:

## 2019-12-04 DIAGNOSIS — Z71.89 OTHER SPECIFIED COUNSELING: ICD-10-CM

## 2019-12-04 PROBLEM — F32.9 MAJOR DEPRESSIVE DISORDER, SINGLE EPISODE, UNSPECIFIED: Chronic | Status: ACTIVE | Noted: 2019-11-30

## 2019-12-04 PROCEDURE — 99232 SBSQ HOSP IP/OBS MODERATE 35: CPT

## 2019-12-04 RX ADMIN — Medication 650 MILLIGRAM(S): at 17:01

## 2019-12-04 RX ADMIN — Medication 500 MILLIGRAM(S): at 09:51

## 2019-12-04 RX ADMIN — Medication 1 TABLET(S): at 09:52

## 2019-12-04 RX ADMIN — Medication 3 MILLIGRAM(S): at 23:23

## 2019-12-04 RX ADMIN — Medication 1 APPLICATION(S): at 20:53

## 2019-12-04 RX ADMIN — SERTRALINE 75 MILLIGRAM(S): 25 TABLET, FILM COATED ORAL at 09:52

## 2019-12-04 RX ADMIN — Medication 500 MILLIGRAM(S): at 23:23

## 2019-12-04 RX ADMIN — Medication 1 APPLICATION(S): at 09:54

## 2019-12-05 VITALS — RESPIRATION RATE: 14 BRPM | OXYGEN SATURATION: 100 % | TEMPERATURE: 98 F

## 2019-12-05 PROCEDURE — 99239 HOSP IP/OBS DSCHRG MGMT >30: CPT

## 2019-12-05 RX ORDER — CEPHALEXIN 500 MG
1 CAPSULE ORAL
Qty: 2 | Refills: 0
Start: 2019-12-05 | End: 2019-12-05

## 2019-12-05 RX ORDER — LANOLIN ALCOHOL/MO/W.PET/CERES
1 CREAM (GRAM) TOPICAL
Qty: 14 | Refills: 1
Start: 2019-12-05 | End: 2020-01-01

## 2019-12-05 RX ORDER — SERTRALINE 25 MG/1
3 TABLET, FILM COATED ORAL
Qty: 42 | Refills: 1
Start: 2019-12-05 | End: 2020-01-01

## 2019-12-05 RX ORDER — ALBUTEROL 90 UG/1
2 AEROSOL, METERED ORAL
Qty: 0 | Refills: 0 | DISCHARGE
Start: 2019-12-05

## 2019-12-05 RX ADMIN — Medication 1 TABLET(S): at 09:13

## 2019-12-05 RX ADMIN — Medication 650 MILLIGRAM(S): at 09:15

## 2019-12-05 RX ADMIN — SERTRALINE 75 MILLIGRAM(S): 25 TABLET, FILM COATED ORAL at 09:13

## 2019-12-05 RX ADMIN — Medication 500 MILLIGRAM(S): at 09:13
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
## 2019-12-05 NOTE — DISCHARGE NOTE BEHAVIORAL HEALTH - MEDICATION SUMMARY - MEDICATIONS TO TAKE
I will START or STAY ON the medications listed below when I get home from the hospital:    sertraline 25 mg oral tablet  -- 3 tab(s) by mouth once a day  -- Indication: For Major depressive disorder, single episode, mild    albuterol 90 mcg/inh inhalation aerosol  -- 2 puff(s) inhaled every 6 hours, As needed, Shortness of Breath and/or Wheezing  -- Indication: For remote h/o asthma    cephalexin 500 mg oral capsule  -- 1 cap(s) by mouth every 12 hours MDD:TO  COMPLETE A 5 DAY COURSE  -- Indication: For UTI ( 2 doses remain)    melatonin 3 mg oral tablet  -- 1 tab(s) by mouth once a day (at bedtime), As needed, Insomnia  -- Indication: For insomnia

## 2019-12-05 NOTE — DISCHARGE NOTE BEHAVIORAL HEALTH - NSBHDCTESTSFT_PSY_A_CORE
HgA1C= 5.4  TSH= 1.25  Fasting lipids  Cholesterol = 152  TG= 69  EKG  VR= 84  QT /QTc= 354 /418  HCG negative   No studies are pending

## 2019-12-05 NOTE — PROGRESS NOTE BEHAVIORAL HEALTH - NSBHADMITIPOBSFT_PSY_A_CORE
no acute risk for harm to self / others on unit
Pt currently denies  SI /HI /AH /VH
Pt currently denies active SI

## 2019-12-05 NOTE — PROGRESS NOTE BEHAVIORAL HEALTH - AXIS III
migraines  UTI  remote h/o asthma  s/p superficial self inflicted cuts to left anterior wrist
migraines
migraines  UTI  remote h/o asthma  s/p superficial self inflicted cuts to left anterior wrist

## 2019-12-05 NOTE — PROGRESS NOTE BEHAVIORAL HEALTH - THOUGHT CONTENT
Preoccupations/Ruminations
Unremarkable
Unremarkable
Hopelessness/Guilt/Suicidality/Other/Ruminations
Hopelessness
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
## 2019-12-05 NOTE — PROGRESS NOTE BEHAVIORAL HEALTH - NSBHADMITMEDEDUDETAILS_A_CORE FT
Patient made aware of risk and benefits of Zoloft
Informed consent discussion begun as to a trial of SSRI Zoloft begun by the pt's PCP prior to pt admission to hospital for worsening depression.   The need for titration and step wise clinical improvement with SSRI reviewed with the pt.

## 2019-12-05 NOTE — PROGRESS NOTE BEHAVIORAL HEALTH - PROBLEM SELECTOR PLAN 2
1.Pt urged to attend therapy groups with focus on CBT / DBT  2.Ongoing staff support

## 2019-12-05 NOTE — DISCHARGE NOTE BEHAVIORAL HEALTH - CONDITIONS AT DISCHARGE
Patient was seen and evaluated by treatment team and is discharged. She is alert, ambulatory; no distress noted nor voiced by patient; she denies current suicidal/homicidal ideation, and denies auditory/visual hallucination.  Discharge and follow-up instructions explained and given to her and she  verbalized understanding.  All belongings returned to her and she is dressed appropriately for d/c.  Patient currently awaiting ride for safe d/c/ Patient was seen and evaluated by treatment team and is discharged. She is alert, ambulatory; no distress noted nor voiced by patient; she denies current suicidal/homicidal ideation, and denies auditory/visual hallucination.  Discharge and follow-up instructions explained and given to her and she  verbalized understanding.  All belongings returned to her and she is dressed appropriately for d/c.  Patient currently awaiting ride for safe d/c.

## 2019-12-05 NOTE — DISCHARGE NOTE BEHAVIORAL HEALTH - NSBHDCADDR1FT_A_CORE
[address] [place] [address] [address] [place] [address]    Appt.  Tuesday 12/10 at 4:00 with Rubina Mendoza.  *** Please arrive at 3:30 for paperwork, bring your drivers license or photo ID and you insurance card.***

## 2019-12-05 NOTE — PROGRESS NOTE BEHAVIORAL HEALTH - NSBHFUPINTERVALCCFT_PSY_A_CORE
" I am feeling much better. Not depressed and I slept better too with a sleeping pill. ( Melatonin) . Can you help me financially and with a job?"  Pt completing a 5 day course of Keflex for presumptive UTI.   Rechecked  UA WNL..
" Thank you very much. I am feeling very good now. I will go to all my appointments  and then I go to family court next week."
' I am feeling very good now. Not depressed.  I am not wanting to hurt myself anymore. I want to leave so I can go to the counselling at [facility] and to [facility]     before I go to the family court next week. "
' I am here because I tried to kill myself on Thursday by cutting my wrist with a knife. I wanted to die. The pain from it was more on Saturday  so I saw my regular doctor and he sent me here."
I am depressed; I do not know what to do.

## 2019-12-05 NOTE — DISCHARGE NOTE BEHAVIORAL HEALTH - NSBHDCSUICSAFETYFT_PSY_A_CORE
1.Pt  safety planned discussed daily  and pt amenable to her after care treatment  2.Pt given tel # for 24/7 Crisis Hotline numbers to call should new safety concerns arise  3.Pt will have access to a new outpatient comprehensive treatment team to assist her many psychosocial needs.  4.Pt can recontact this hospital for any concerns prior to her outpatient appointments on 12/10/19   5.Pt aware that she can always return to the nearest hospital ER 24/7 should new safety concerns arise
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
## 2019-12-05 NOTE — PROGRESS NOTE BEHAVIORAL HEALTH - NS ED BHA MSE GENERAL APPEARANCE
No deformities present/Other/Well developed
Other/Well developed/No deformities present
Well developed/No deformities present/Other
No deformities present/Well developed/Other
Well developed

## 2019-12-05 NOTE — PROGRESS NOTE BEHAVIORAL HEALTH - PROBLEM SELECTOR PROBLEM 2
Persistent depressive disorder

## 2019-12-05 NOTE — DISCHARGE NOTE BEHAVIORAL HEALTH - PROVIDER TOKENS
FREE:[LAST:[tba],FIRST:[tba],PHONE:[(   )    -],FAX:[(   )    -],ADDRESS:[Pt has intake appointment at [facility] in [place] on 12/10/19 at 4 pm]]

## 2019-12-05 NOTE — PROGRESS NOTE BEHAVIORAL HEALTH - PRIMARY DX
MDD (major depressive disorder), recurrent episode, severe
MDD (major depressive disorder), recurrent severe, without psychosis

## 2019-12-05 NOTE — DISCHARGE NOTE BEHAVIORAL HEALTH - NSBHDCCRISISPLAN1FT_PSY_A_CORE
Tell a trusted friend, tell staff at the clinic, go to the nearest emergency room.  You may call 911 for assistance.

## 2019-12-05 NOTE — DISCHARGE NOTE BEHAVIORAL HEALTH - PAST PSYCHIATRIC HISTORY
See above.  Hx of OD of pills in 2009 but not hospitalized.   Denies ever having OP psych. TX.  PCP recently started pt on zoloft.

## 2019-12-05 NOTE — DISCHARGE NOTE BEHAVIORAL HEALTH - CARE PROVIDER_API CALL
diaz perales  Pt has intake appointment at [facility] in [place] on 12/10/19 at 4 pm  Phone: (   )    -  Fax: (   )    -  Follow Up Time:

## 2019-12-05 NOTE — DISCHARGE NOTE BEHAVIORAL HEALTH - NSBHDCMEDICALFT_PSY_A_CORE
UTI - ( treated x 5 day course of Keflex 500 mg po bid  ( 3 more doses to complete 5 day course)  Urine culture No growth  remote h/o asthma  h/o migraine  h/a ( no current medication) UTI - ( treated x 5 day course of Keflex 500 mg po bid  ( 2 more doses to complete 5 day course)  Urine culture No growth  remote h/o asthma  h/o migraine  h/a ( no current medication)

## 2019-12-05 NOTE — DISCHARGE NOTE BEHAVIORAL HEALTH - NSBHDCCASEMGRFT_PSY_A_CORE
You were seen at [facility] by Alem Weiner and enrolled in [facility].  You can call to see who you  is.  ([phone])

## 2019-12-05 NOTE — PROGRESS NOTE BEHAVIORAL HEALTH - SUMMARY
34/F with no established past psych hx, no prior psych hosps, reported prior hx of OD on advil in 2009 but did not seek any psych help nor was she admitted due to the OD; has no substance abuse hx.  Today, BIB EMS as a referral from his PCP's office where she initially reported having suicidal thoughts.  Whilst at the triage, she was noted to have superficial cuts to the left forearm/wrist area.  At this time, she presents with symptoms suggestive of severe MDD; there is psychotic component (referential thoughts/persecutory delusions - "nobody likes me").  Said symptoms precipitated and perpetuated by ongoing marital conflict particularly re: custody over her children and children support; other psychosocial stressors involved are: limited social support, financial constraints, ongoing legal issues.  Said symptoms have significantly caused functional impairment on the Pt.  Pt has no symptoms suggestive of yessica.  She is not delirious.  Not intoxicated or in withdrawal.  At this time, Pt cannot be safely discharged back to the community as she remains a threat to self.  She will benefit from in-Pt psych admission for stabilization and ensuring safety    RECOMMENDATIONS:   1. CONTINUE WITH ZOLOFT 50mg daily. titrate as necessary  2. ZYPREXA 5mg HS for control of psychosis, sleep disturbance  3. PRNs: ZYPREXA 5mg PO/IM Q6Hrs for agitation/SEVERE AGITATION   4. ADMIT 9.13 once bed available and Pt is medically cleared
34/F with no established past psych hx, no prior psych hosps, reported prior hx of OD on advil in 2009 but did not seek any psych help nor was she admitted due to the OD; has no substance abuse hx.  Today, BIB EMS as a referral from his PCP's office where she initially reported having suicidal thoughts.  Whilst at the triage, she was noted to have superficial cuts to the left forearm/wrist area.      Patient presenting with symptoms indicative of Major Depressive Disorder with suicidal ideation. Patient currently presenting as a significant risk to harm to self warranting an inpatient psychiatric admission for safety and stabilization.
34/F with no established past psych hx, no prior psych hosps, reported prior hx of OD on advil in 2009 but did not seek any psych help nor was she admitted due to the OD; has no substance abuse hx.  Today, BIB EMS as a referral from his PCP's office where she initially reported having suicidal thoughts.  Whilst at the triage, she was noted to have superficial cuts to the left forearm/wrist area.  At this time, she presents with symptoms suggestive of severe MDD; there is psychotic component (referential thoughts/persecutory delusions - "nobody likes me").  Said symptoms precipitated and perpetuated by ongoing marital conflict particularly re: custody over her children and children support; other psychosocial stressors involved are: limited social support, financial constraints, ongoing legal issues.  Said symptoms have significantly caused functional impairment on the Pt.  Pt has no symptoms suggestive of yessica.  She is not delirious.  Not intoxicated or in withdrawal.  At this time, Pt cannot be safely discharged back to the community as she remains a threat to self.  She will benefit from in-Pt psych admission for stabilization and ensuring safety    RECOMMENDATIONS:   1. CONTINUE WITH ZOLOFT 50mg daily. titrate as necessary  2. ZYPREXA 5mg HS for control of psychosis, sleep disturbance  3. PRNs: ZYPREXA 5mg PO/IM Q6Hrs for agitation/SEVERE AGITATION   4. ADMIT 9.13 once bed available and Pt is medically cleared

## 2019-12-05 NOTE — DISCHARGE NOTE BEHAVIORAL HEALTH - NSBHDCSWCOMMENTSFT_PSY_A_CORE
Ms. Mitchell was educated about the importance of attending outpatient mental health treatment, taking her medication as prescribed and about her discharge plan.  She has been advised not to stop taking any medication unless told to do so by a physician.

## 2019-12-05 NOTE — DISCHARGE NOTE BEHAVIORAL HEALTH - NSBHDCLABSFT_PSY_A_CORE
q yearly CBC , CMP while taking medication ( SSRI), sooner as per pt's PCP Dr Maza in [place], [place]   Monitoring of vital signs, weight q 1-2 monthly with medication

## 2019-12-05 NOTE — PROGRESS NOTE BEHAVIORAL HEALTH - NSBHATTESTSEENBY_PSY_A_CORE
NP with telephonic supervision from Attending Psychiatrist
attending Psychiatrist without NP/Trainee

## 2019-12-05 NOTE — PROGRESS NOTE BEHAVIORAL HEALTH - BODY HABITUS
Average build/Well nourished
Well nourished/Average build
Well nourished/Average build
Average build/Well nourished
Well nourished

## 2019-12-05 NOTE — PROGRESS NOTE BEHAVIORAL HEALTH - RISK ASSESSMENT
RISK ASSESSMENT:   Modifiable risk factors: depression, anxiety and delusions  ]Unmodifiable risk factors: Pt is doing poorly, hx of OD on pills, had SIB, limited social support, legal issues, financial constraints   Protective factors: domiciled, close relationship and therapeutic relationship with children, no access to guns, not manic, no substance abuse hx, no fam hx of SA, Cheondoism, working     At this time given all risks taken into consideration, the Pt is at chronically elevated risk of harming self and would not be deemed dischargeable back to the community.  That increased risk can be mitigated by a psychiatric admission with supervision, continuing psych meds with titration towards efficacious dosing range
RISK ASSESSMENT:   Modifiable risk factors: depression, anxiety and delusions  ]Unmodifiable risk factors: Pt is doing poorly, hx of OD on pills, had SIB, limited social support, legal issues, financial constraints   Protective factors: domiciled, close relationship and therapeutic relationship with children, no access to guns, not manic, no substance abuse hx, no fam hx of SA, Hindu, working     At this time given all risks taken into consideration, the Pt is at chronically elevated risk of harming self and would not be deemed dischargeable back to the community.  That increased risk can be mitigated by a psychiatric admission with supervision, continuing psych meds with titration towards efficacious dosing range
RISK ASSESSMENT:   Modifiable risk factors: depression, anxiety and delusions  ]Unmodifiable risk factors: Pt is doing poorly, hx of OD on pills, had SIB, limited social support, legal issues, financial constraints   Protective factors: domiciled, close relationship and therapeutic relationship with children, no access to guns, not manic, no substance abuse hx, no fam hx of SA, Hinduism, working     At this time given all risks taken into consideration, the Pt is at chronically elevated risk of harming self and would not be deemed dischargeable back to the community.  That increased risk can be mitigated by a psychiatric admission with supervision, continuing psych meds with titration towards efficacious dosing range
HIGH RISK     ACUTE RISK FACTORS: depressed mood, hopelessness, anergia, amotivation, anhedonia, difficulty getting out of bed, suicidal ideation, self-injurious behaviors, separation, impending divorce, financial stressors, custody english for children    CHRONIC RISK FACTORS: suicide attempt via overdose 2009    PROTECTIVE FACTORS: access to healthcare, motivation for psychotropic management     MITIGATION STRATEGIES: psychotropic management, therapy, building coping skills
RISK ASSESSMENT:   Modifiable risk factors: depression, anxiety and delusions  ]Unmodifiable risk factors: Pt is doing poorly, hx of OD on pills, had SIB, limited social support, legal issues, financial constraints   Protective factors: domiciled, close relationship and therapeutic relationship with children, no access to guns, not manic, no substance abuse hx, no fam hx of SA, Druze, working     At this time given all risks taken into consideration, the Pt is at chronically elevated risk of harming self and would not be deemed dischargeable back to the community.  That increased risk can be mitigated by a psychiatric admission with supervision, continuing psych meds with titration towards efficacious dosing range

## 2019-12-05 NOTE — PROGRESS NOTE BEHAVIORAL HEALTH - NSBHLEGALSTATUS_PSY_A_CORE
9.13 (Voluntary)
pt not currently actively  with SI/9.13 (Voluntary)
9.13 (Voluntary)/pt not currently actively  with SI

## 2019-12-05 NOTE — PROGRESS NOTE BEHAVIORAL HEALTH - NSBHCHARTREVIEWIMAGING_PSY_A_CORE FT
MEDICATIONS  (STANDING):  BACItracin   Ointment 1 Application(s) Topical two times a day  cephalexin 500 milliGRAM(s) Oral every 12 hours  lactobacillus acidophilus 1 Tablet(s) Oral daily  sertraline 75 milliGRAM(s) Oral daily    MEDICATIONS  (PRN):  acetaminophen   Tablet .. 650 milliGRAM(s) Oral every 6 hours PRN Moderate Pain (4 - 6)  ALBUTerol    90 MICROgram(s) HFA Inhaler 2 Puff(s) Inhalation every 6 hours PRN Shortness of Breath and/or Wheezing  diphenhydrAMINE   Injectable 50 milliGRAM(s) IntraMuscular every 6 hours PRN Agitation  haloperidol    Injectable 5 milliGRAM(s) IntraMuscular every 6 hours PRN Agitation  LORazepam   Injectable 2 milliGRAM(s) IntraMuscular every 4 hours PRN moderate agitation  melatonin 3 milliGRAM(s) Oral at bedtime PRN Insomnia
MEDICATIONS  (STANDING):  BACItracin   Ointment 1 Application(s) Topical two times a day  cephalexin 500 milliGRAM(s) Oral every 12 hours  influenza   Vaccine 0.5 milliLiter(s) IntraMuscular once  lactobacillus acidophilus 1 Tablet(s) Oral daily    MEDICATIONS  (PRN):  acetaminophen   Tablet .. 650 milliGRAM(s) Oral every 6 hours PRN Moderate Pain (4 - 6)  ALBUTerol    90 MICROgram(s) HFA Inhaler 2 Puff(s) Inhalation every 6 hours PRN Shortness of Breath and/or Wheezing  diphenhydrAMINE   Injectable 50 milliGRAM(s) IntraMuscular every 6 hours PRN Agitation  haloperidol    Injectable 5 milliGRAM(s) IntraMuscular every 6 hours PRN Agitation  LORazepam   Injectable 2 milliGRAM(s) IntraMuscular every 4 hours PRN moderate agitation  melatonin 3 milliGRAM(s) Oral at bedtime PRN Insomnia

## 2019-12-05 NOTE — PROGRESS NOTE BEHAVIORAL HEALTH - PROBLEM SELECTOR PROBLEM 1
MDD (major depressive disorder), recurrent severe, without psychosis

## 2019-12-05 NOTE — PROGRESS NOTE BEHAVIORAL HEALTH - NSBHADMITDANGERSELF_PSY_A_CORE
suicidal behavior/suicidal ideation with plan and means
suicidal behavior

## 2019-12-05 NOTE — DISCHARGE NOTE BEHAVIORAL HEALTH - NSBHDCADDFT_PSY_A_CORE
Treatment goals  1.Depression / Self injurious behavior- goal largely achieved  with combined multidisciplinary treatment and med management. Pt is tolerating med regimen with good clinical effect to date and without side effects.. The pt continues to deny SI /HI /AH /VH

## 2019-12-05 NOTE — PROGRESS NOTE BEHAVIORAL HEALTH - NSBHCHARTREVIEWVS_PSY_A_CORE FT
Vital Signs Last 24 Hrs  T(C): 36.6 (05 Dec 2019 09:45), Max: 36.6 (05 Dec 2019 09:45)  T(F): 97.9 (05 Dec 2019 09:45), Max: 97.9 (05 Dec 2019 09:45)  HR: --  BP: --  BP(mean): --  RR: 14 (05 Dec 2019 09:45) (14 - 14)  SpO2: 100% (05 Dec 2019 09:45) (100% - 100%)
Vital Signs Last 24 Hrs  T(C): 36.7 (02 Dec 2019 11:42), Max: 36.7 (02 Dec 2019 11:42)  T(F): 98.1 (02 Dec 2019 11:42), Max: 98.1 (02 Dec 2019 11:42)  HR: --  BP: --  BP(mean): --  RR: 12 (02 Dec 2019 11:42) (12 - 12)  SpO2: 100% (02 Dec 2019 11:42) (100% - 100%)
Vital Signs Last 24 Hrs  T(C): 36.7 (04 Dec 2019 08:02), Max: 36.7 (04 Dec 2019 08:02)  T(F): 98 (04 Dec 2019 08:02), Max: 98 (04 Dec 2019 08:02)  HR: --  BP: --  BP(mean): --  RR: 16 (04 Dec 2019 08:02) (16 - 16)  SpO2: 99% (04 Dec 2019 08:02) (99% - 99%)
Vital Signs Last 24 Hrs  T(C): 36.9 (03 Dec 2019 09:10), Max: 36.9 (03 Dec 2019 09:10)  T(F): 98.4 (03 Dec 2019 09:10), Max: 98.4 (03 Dec 2019 09:10)  HR: --  BP: --  BP(mean): --  RR: 14 (03 Dec 2019 09:10) (14 - 14)  SpO2: 100% (03 Dec 2019 09:10) (100% - 100%)
Vital Signs Last 24 Hrs  T(C): 36.8 (01 Dec 2019 09:40), Max: 36.8 (01 Dec 2019 09:40)  T(F): 98.2 (01 Dec 2019 09:40), Max: 98.2 (01 Dec 2019 09:40)  HR: 93 (01 Dec 2019 09:40) (80 - 93)  BP: 106/66 (01 Dec 2019 09:40) (106/66 - 123/65)  BP(mean): --  RR: 18 (01 Dec 2019 09:40) (18 - 18)  SpO2: 100% (01 Dec 2019 09:40) (100% - 100%)

## 2019-12-05 NOTE — DISCHARGE NOTE BEHAVIORAL HEALTH - NSBHDCMEDSFT_PSY_A_CORE
MEDICATIONS  (STANDING):  cephalexin 500 milliGRAM(s) Oral every 12 hours # 3 tabs to complete 5 day course for treatment of a UTI  lactobacillus acidophilus 1 Tablet(s) Oral daily  sertraline 75 milliGRAM(s) Oral daily  melatonin 3 milliGRAM(s) Oral at bedtime PRN Insomnia     The patient has been educated to continue the medications until told by her outpatient doctor to stop. MEDICATIONS  (STANDING):  cephalexin 500 milliGRAM(s) Oral every 12 hours # 3 tabs to complete 5 day course for treatment of a UTI  lactobacillus acidophilus 1 Tablet(s) Oral daily ( over the counter)  sertraline 75 milliGRAM(s) Oral daily  melatonin 3 milliGRAM(s) Oral at bedtime PRN Insomnia ( OTC)     The patient has been educated to continue the medications until told by her outpatient doctor to stop.

## 2019-12-06 NOTE — CHART NOTE - NSCHARTNOTEFT_GEN_A_CORE
Patient arrived home safely, picked up medications and denied suicidal ideation.  Encouraged to continue with aftercare.

## 2019-12-09 DIAGNOSIS — F33.1 MAJOR DEPRESSIVE DISORDER, RECURRENT, MODERATE: ICD-10-CM

## 2019-12-09 DIAGNOSIS — R45.851 SUICIDAL IDEATIONS: ICD-10-CM

## 2019-12-09 DIAGNOSIS — Z91.5 PERSONAL HISTORY OF SELF-HARM: ICD-10-CM
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
## 2019-12-09 DIAGNOSIS — R41.3 OTHER AMNESIA: ICD-10-CM
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
## 2019-12-09 DIAGNOSIS — J45.909 UNSPECIFIED ASTHMA, UNCOMPLICATED: ICD-10-CM

## 2019-12-09 DIAGNOSIS — N39.0 URINARY TRACT INFECTION, SITE NOT SPECIFIED: ICD-10-CM
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
## 2019-12-09 DIAGNOSIS — R63.0 ANOREXIA: ICD-10-CM

## 2019-12-09 DIAGNOSIS — G47.00 INSOMNIA, UNSPECIFIED: ICD-10-CM

## 2019-12-09 DIAGNOSIS — F34.1 DYSTHYMIC DISORDER: ICD-10-CM

## 2019-12-09 DIAGNOSIS — S51.812A LACERATION WITHOUT FOREIGN BODY OF LEFT FOREARM, INITIAL ENCOUNTER: ICD-10-CM

## 2019-12-09 DIAGNOSIS — Z63.5 DISRUPTION OF FAMILY BY SEPARATION AND DIVORCE: ICD-10-CM

## 2019-12-09 DIAGNOSIS — G43.909 MIGRAINE, UNSPECIFIED, NOT INTRACTABLE, WITHOUT STATUS MIGRAINOSUS: ICD-10-CM

## 2019-12-09 SDOH — SOCIAL STABILITY - SOCIAL INSECURITY: DISRUPTION OF FAMILY BY SEPARATION AND DIVORCE: Z63.5

## 2019-12-10 DIAGNOSIS — Z76.89 PERSONS ENCOUNTERING HEALTH SERVICES IN OTHER SPECIFIED CIRCUMSTANCES: ICD-10-CM

## 2020-01-02 NOTE — PROGRESS NOTE BEHAVIORAL HEALTH - NSBHADDHXPSYCHFT_PSY_A_CORE
Major Depressive Disorder since 2017; one suicide attempt via overdose 2009 (no medical or psychiatric treatment); recently started on Zoloft 25 mg by PCP
verbal instruction/skill demonstration/individual instruction

## 2020-11-13 NOTE — ED ADULT NURSE NOTE - NSFALLRSKASSESSTYPE_ED_ALL_ED
"Appreciate psychiatry consult:  "Can use haldol 5mg IM/IV/PO for non redirectable agitation.  Per my conversation with the patient's aunt, her refusal to speak may have a volitional component as opposed to purely a negative feature of her schizophrenia.  Plan to admit to [facility] or [facility] if possible as these facilities are familiar with her case.  Needs 1:1 sitter and PEC."    " Initial (On Arrival)

## 2021-11-10 NOTE — DISCHARGE NOTE BEHAVIORAL HEALTH - NSBHDCTHERAPYFT_PSY_A_CORE
DISPLAY PLAN FREE TEXT Therapeutic milieu, individual and group therapy, medication management, psychoeducation, safety planning, discharge planning.

## 2021-12-01 PROCEDURE — G9005: CPT
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
## 2023-03-28 NOTE — ED ADULT TRIAGE NOTE - NS_BH TRG QUESTION2_ED_ALL_ED
Care Transitions [facility] Week 1 Telephone Call Attempt    Hospital Discharge Date: 3/14/23  Discharge Location: [facility] Hospital: [facility] Discharge Date: 3/25/23    Call Attempt Date: 3/28/2023  Call Attempt: First    
No

## 2024-05-01 NOTE — H&P ADULT - ATTENDING COMMENTS
English will add bacitracin bid to superficial lacerations and follow as consult due to pyuria and will continue abx started in ED and pending urine culture, will adjust accordingly. Should pt become febrile, or sx worsen, please recall PA or hospitalist to evaluate patient.